# Patient Record
Sex: MALE | Race: BLACK OR AFRICAN AMERICAN | NOT HISPANIC OR LATINO | Employment: FULL TIME | URBAN - METROPOLITAN AREA
[De-identification: names, ages, dates, MRNs, and addresses within clinical notes are randomized per-mention and may not be internally consistent; named-entity substitution may affect disease eponyms.]

---

## 2021-01-15 ENCOUNTER — OFFICE VISIT (OUTPATIENT)
Dept: FAMILY MEDICINE CLINIC | Facility: CLINIC | Age: 31
End: 2021-01-15

## 2021-01-15 VITALS
DIASTOLIC BLOOD PRESSURE: 60 MMHG | TEMPERATURE: 96.8 F | HEIGHT: 77 IN | SYSTOLIC BLOOD PRESSURE: 104 MMHG | HEART RATE: 64 BPM | WEIGHT: 183 LBS | BODY MASS INDEX: 21.61 KG/M2 | OXYGEN SATURATION: 96 %

## 2021-01-15 DIAGNOSIS — Z13.220 LIPID SCREENING: ICD-10-CM

## 2021-01-15 DIAGNOSIS — Z76.89 ENCOUNTER TO ESTABLISH CARE: Primary | ICD-10-CM

## 2021-01-15 DIAGNOSIS — Z31.69 INFERTILITY COUNSELING: ICD-10-CM

## 2021-01-15 DIAGNOSIS — Z11.3 SCREEN FOR STD (SEXUALLY TRANSMITTED DISEASE): ICD-10-CM

## 2021-01-15 LAB
ALBUMIN SERPL-MCNC: 4.6 G/DL (ref 3.5–5.2)
ALBUMIN/GLOB SERPL: 1.6 G/DL
ALP SERPL-CCNC: 78 U/L (ref 39–117)
ALT SERPL W P-5'-P-CCNC: 11 U/L (ref 1–41)
ANION GAP SERPL CALCULATED.3IONS-SCNC: 9 MMOL/L (ref 5–15)
AST SERPL-CCNC: 18 U/L (ref 1–40)
BACTERIA UR QL AUTO: NORMAL /HPF
BILIRUB SERPL-MCNC: 0.9 MG/DL (ref 0–1.2)
BILIRUB UR QL STRIP: NEGATIVE
BUN SERPL-MCNC: 13 MG/DL (ref 6–20)
BUN/CREAT SERPL: 13.3 (ref 7–25)
CALCIUM SPEC-SCNC: 9.8 MG/DL (ref 8.6–10.5)
CHLORIDE SERPL-SCNC: 103 MMOL/L (ref 98–107)
CHOLEST SERPL-MCNC: 206 MG/DL (ref 0–200)
CLARITY UR: CLEAR
CO2 SERPL-SCNC: 27 MMOL/L (ref 22–29)
COLOR UR: YELLOW
CREAT SERPL-MCNC: 0.98 MG/DL (ref 0.76–1.27)
ERYTHROCYTE [DISTWIDTH] IN BLOOD BY AUTOMATED COUNT: 12.7 % (ref 12.3–15.4)
GFR SERPL CREATININE-BSD FRML MDRD: 109 ML/MIN/1.73
GLOBULIN UR ELPH-MCNC: 2.8 GM/DL
GLUCOSE SERPL-MCNC: 89 MG/DL (ref 65–99)
GLUCOSE UR STRIP-MCNC: NEGATIVE MG/DL
HCT VFR BLD AUTO: 46 % (ref 37.5–51)
HDLC SERPL-MCNC: 64 MG/DL (ref 40–60)
HGB BLD-MCNC: 14.5 G/DL (ref 13–17.7)
HGB UR QL STRIP.AUTO: ABNORMAL
HIV1+2 AB SER QL: NORMAL
KETONES UR QL STRIP: NEGATIVE
LDLC SERPL CALC-MCNC: 131 MG/DL (ref 0–100)
LDLC/HDLC SERPL: 2.03 {RATIO}
LEUKOCYTE ESTERASE UR QL STRIP.AUTO: NEGATIVE
LYMPHOCYTES # BLD AUTO: 2.1 10*3/MM3 (ref 0.7–3.1)
LYMPHOCYTES NFR BLD AUTO: 43.1 % (ref 19.6–45.3)
MCH RBC QN AUTO: 26.5 PG (ref 26.6–33)
MCHC RBC AUTO-ENTMCNC: 31.5 G/DL (ref 31.5–35.7)
MCV RBC AUTO: 84.2 FL (ref 79–97)
MONOCYTES # BLD AUTO: 0.2 10*3/MM3 (ref 0.1–0.9)
MONOCYTES NFR BLD AUTO: 4.7 % (ref 5–12)
NEUTROPHILS NFR BLD AUTO: 2.5 10*3/MM3 (ref 1.7–7)
NEUTROPHILS NFR BLD AUTO: 52.2 % (ref 42.7–76)
NITRITE UR QL STRIP: NEGATIVE
PH UR STRIP.AUTO: 5.5 [PH] (ref 4.6–8)
PLATELET # BLD AUTO: 269 10*3/MM3 (ref 140–450)
PMV BLD AUTO: 6.7 FL (ref 6–12)
POTASSIUM SERPL-SCNC: 4.5 MMOL/L (ref 3.5–5.2)
PROT SERPL-MCNC: 7.4 G/DL (ref 6–8.5)
PROT UR QL STRIP: NEGATIVE
RBC # BLD AUTO: 5.46 10*6/MM3 (ref 4.14–5.8)
RBC # UR: NORMAL /HPF
REF LAB TEST METHOD: NORMAL
SODIUM SERPL-SCNC: 139 MMOL/L (ref 136–145)
SP GR UR STRIP: >=1.03 (ref 1–1.03)
SQUAMOUS #/AREA URNS HPF: NORMAL /HPF
TRIGL SERPL-MCNC: 62 MG/DL (ref 0–150)
TSH SERPL DL<=0.05 MIU/L-ACNC: 1.11 UIU/ML (ref 0.27–4.2)
UROBILINOGEN UR QL STRIP: ABNORMAL
VLDLC SERPL-MCNC: 11 MG/DL (ref 5–40)
WBC # BLD AUTO: 4.8 10*3/MM3 (ref 3.4–10.8)
WBC UR QL AUTO: NORMAL /HPF

## 2021-01-15 PROCEDURE — G0432 EIA HIV-1/HIV-2 SCREEN: HCPCS | Performed by: NURSE PRACTITIONER

## 2021-01-15 PROCEDURE — 99203 OFFICE O/P NEW LOW 30 MIN: CPT | Performed by: NURSE PRACTITIONER

## 2021-01-15 PROCEDURE — 81001 URINALYSIS AUTO W/SCOPE: CPT | Performed by: NURSE PRACTITIONER

## 2021-01-15 PROCEDURE — 85025 COMPLETE CBC W/AUTO DIFF WBC: CPT | Performed by: NURSE PRACTITIONER

## 2021-01-15 PROCEDURE — 84443 ASSAY THYROID STIM HORMONE: CPT | Performed by: NURSE PRACTITIONER

## 2021-01-15 PROCEDURE — 86592 SYPHILIS TEST NON-TREP QUAL: CPT | Performed by: NURSE PRACTITIONER

## 2021-01-15 PROCEDURE — 80061 LIPID PANEL: CPT | Performed by: NURSE PRACTITIONER

## 2021-01-15 PROCEDURE — 80053 COMPREHEN METABOLIC PANEL: CPT | Performed by: NURSE PRACTITIONER

## 2021-01-15 NOTE — PROGRESS NOTES
"Chief Complaint  Establish Care and Fertility (wife trying to get pregnant and nothing is happening yet)    Subjective          Shiva Jones presents to [facility] for   History of Present Illness  Here today to establish care, no recent pcp, he is , works as fork , does stay active, states diet is ok, he denies smoking. He denies hx of asthma or murmur. He is fasting today.   C/o infertility, states he and his wife are trying for pregnancy but has not been successful yet. He states they have been trying for 2-3 months, states wife has had check ups for infertility. Denies problems with erection, denies any new partners he would like std screen today. denies any urinary symptoms.   Sees [facility] for rectal bleeding, has upcoming appt this month. Does have family hx of colon cancer in father. He has never has colonoscopy. He states last bleeding in 10/2020, denies diarrhea. States appetite is ok.          Objective   Vital Signs:   /60 (BP Location: Left arm, Patient Position: Sitting, Cuff Size: Adult)   Pulse 64   Temp 96.8 °F (36 °C) (Infrared)   Ht 195.6 cm (77.01\")   Wt 83 kg (183 lb)   SpO2 96%   BMI 21.70 kg/m²     Physical Exam  Vitals signs and nursing note reviewed.   Constitutional:       Appearance: He is well-developed.   HENT:      Head: Normocephalic.   Eyes:      Pupils: Pupils are equal, round, and reactive to light.   Cardiovascular:      Rate and Rhythm: Normal rate and regular rhythm.      Heart sounds: Normal heart sounds.   Pulmonary:      Effort: Pulmonary effort is normal.      Breath sounds: Normal breath sounds.   Skin:     General: Skin is warm and dry.   Neurological:      Mental Status: He is alert and oriented to person, place, and time.   Psychiatric:         Behavior: Behavior normal.         Judgment: Judgment normal.        Result Review :                 Assessment and Plan    Problem List Items Addressed This " Visit     None      Visit Diagnoses     Encounter to establish care    -  Primary    Relevant Orders    Comprehensive Metabolic Panel    CBC & Differential    Lipid Panel    TSH    Testosterone, Free, Total    CBC Auto Differential    Infertility counseling        Relevant Orders    Comprehensive Metabolic Panel    CBC & Differential    Lipid Panel    TSH    Testosterone, Free, Total    Chlamydia trachomatis, Neisseria gonorrhoeae, PCR - Urine, Urine, Clean Catch    HIV-1 / O / 2 Ag / Antibody 4th Generation    RPR    Urinalysis With Microscopic - Urine, Clean Catch    CBC Auto Differential    Urinalysis without microscopic (no culture) - Urine, Clean Catch    Urinalysis, Microscopic Only - Urine, Clean Catch    Lipid screening        Relevant Orders    Comprehensive Metabolic Panel    CBC & Differential    Lipid Panel    TSH    Testosterone, Free, Total    CBC Auto Differential    Screen for STD (sexually transmitted disease)        Relevant Orders    Chlamydia trachomatis, Neisseria gonorrhoeae, PCR - Urine, Urine, Clean Catch    HIV-1 / O / 2 Ag / Antibody 4th Generation    RPR    Urinalysis With Microscopic - Urine, Clean Catch    Urinalysis without microscopic (no culture) - Urine, Clean Catch    Urinalysis, Microscopic Only - Urine, Clean Catch          Follow Up   No follow-ups on file.  Patient was given instructions and counseling regarding his condition or for health maintenance advice. Please see specific information pulled into the AVS if appropriate.     Labs today will call with results.   Cont diet, exercise, begin MV OTC daily.   Advised he and wife can monitor ovulation for conception, cont f/u with his wife's OB if symptoms persist for possible fertility consult and work up.   Patient agrees with plan of care and understands instructions. Call if worsening symptoms or any problems or concerns.

## 2021-01-15 NOTE — PATIENT INSTRUCTIONS
Labs today will call with results.   Cont diet, exercise, begin MV OTC daily.   Advised he and wife can monitor ovulation for conception, cont f/u with his wife's OB if symptoms persist for possible fertility consult and work up.   Patient agrees with plan of care and understands instructions. Call if worsening symptoms or any problems or concerns.

## 2021-01-16 LAB — RPR SER QL: NORMAL

## 2021-01-18 DIAGNOSIS — R31.21 ASYMPTOMATIC MICROSCOPIC HEMATURIA: Primary | ICD-10-CM

## 2021-01-18 LAB
C TRACH RRNA SPEC QL NAA+PROBE: NEGATIVE
N GONORRHOEA RRNA SPEC QL NAA+PROBE: NEGATIVE
TESTOST FREE SERPL-MCNC: 20.7 PG/ML (ref 8.7–25.1)
TESTOST SERPL-MCNC: 733 NG/DL (ref 264–916)

## 2023-08-02 ENCOUNTER — APPOINTMENT (OUTPATIENT)
Dept: GENERAL RADIOLOGY | Facility: HOSPITAL | Age: 33
End: 2023-08-02
Payer: COMMERCIAL

## 2023-08-02 ENCOUNTER — HOSPITAL ENCOUNTER (EMERGENCY)
Facility: HOSPITAL | Age: 33
Discharge: HOME | End: 2023-08-02
Attending: EMERGENCY MEDICINE | Admitting: EMERGENCY MEDICINE
Payer: COMMERCIAL

## 2023-08-02 VITALS
WEIGHT: 175 LBS | RESPIRATION RATE: 15 BRPM | SYSTOLIC BLOOD PRESSURE: 135 MMHG | HEIGHT: 77 IN | BODY MASS INDEX: 20.66 KG/M2 | TEMPERATURE: 97.9 F | HEART RATE: 61 BPM | OXYGEN SATURATION: 97 % | DIASTOLIC BLOOD PRESSURE: 55 MMHG

## 2023-08-02 DIAGNOSIS — I30.9 ACUTE PERICARDITIS, UNSPECIFIED TYPE: Primary | ICD-10-CM

## 2023-08-02 LAB
ALBUMIN SERPL-MCNC: 4.8 G/DL (ref 3.5–5.2)
ALBUMIN/GLOB SERPL: 1.6 G/DL
ALP SERPL-CCNC: 77 U/L (ref 39–117)
ALT SERPL W P-5'-P-CCNC: 19 U/L (ref 1–41)
ANION GAP SERPL CALCULATED.3IONS-SCNC: 9 MMOL/L (ref 5–15)
AST SERPL-CCNC: 22 U/L (ref 1–40)
BASOPHILS # BLD AUTO: 0.03 10*3/MM3 (ref 0–0.2)
BASOPHILS NFR BLD AUTO: 0.7 % (ref 0–1.5)
BILIRUB SERPL-MCNC: 0.8 MG/DL (ref 0–1.2)
BUN SERPL-MCNC: 10 MG/DL (ref 6–20)
BUN/CREAT SERPL: 9 (ref 7–25)
CALCIUM SPEC-SCNC: 10.2 MG/DL (ref 8.6–10.5)
CHLORIDE SERPL-SCNC: 100 MMOL/L (ref 98–107)
CO2 SERPL-SCNC: 27 MMOL/L (ref 22–29)
CREAT SERPL-MCNC: 1.11 MG/DL (ref 0.76–1.27)
CRP SERPL-MCNC: <0.3 MG/DL (ref 0–0.5)
DEPRECATED RDW RBC AUTO: 40.4 FL (ref 37–54)
EGFRCR SERPLBLD CKD-EPI 2021: 90.5 ML/MIN/1.73
EOSINOPHIL # BLD AUTO: 0.04 10*3/MM3 (ref 0–0.4)
EOSINOPHIL NFR BLD AUTO: 0.9 % (ref 0.3–6.2)
ERYTHROCYTE [DISTWIDTH] IN BLOOD BY AUTOMATED COUNT: 13.2 % (ref 12.3–15.4)
ERYTHROCYTE [SEDIMENTATION RATE] IN BLOOD: 7 MM/HR (ref 0–15)
GLOBULIN UR ELPH-MCNC: 3 GM/DL
GLUCOSE SERPL-MCNC: 88 MG/DL (ref 65–99)
HCT VFR BLD AUTO: 46 % (ref 37.5–51)
HGB BLD-MCNC: 15.1 G/DL (ref 13–17.7)
HOLD SPECIMEN: NORMAL
HOLD SPECIMEN: NORMAL
IMM GRANULOCYTES # BLD AUTO: 0.01 10*3/MM3 (ref 0–0.05)
IMM GRANULOCYTES NFR BLD AUTO: 0.2 % (ref 0–0.5)
LYMPHOCYTES # BLD AUTO: 1.89 10*3/MM3 (ref 0.7–3.1)
LYMPHOCYTES NFR BLD AUTO: 43.6 % (ref 19.6–45.3)
MCH RBC QN AUTO: 27.6 PG (ref 26.6–33)
MCHC RBC AUTO-ENTMCNC: 32.8 G/DL (ref 31.5–35.7)
MCV RBC AUTO: 83.9 FL (ref 79–97)
MONOCYTES # BLD AUTO: 0.23 10*3/MM3 (ref 0.1–0.9)
MONOCYTES NFR BLD AUTO: 5.3 % (ref 5–12)
NEUTROPHILS NFR BLD AUTO: 2.13 10*3/MM3 (ref 1.7–7)
NEUTROPHILS NFR BLD AUTO: 49.3 % (ref 42.7–76)
NRBC BLD AUTO-RTO: 0 /100 WBC (ref 0–0.2)
PLATELET # BLD AUTO: 234 10*3/MM3 (ref 140–450)
PMV BLD AUTO: 8.5 FL (ref 6–12)
POTASSIUM SERPL-SCNC: 4.1 MMOL/L (ref 3.5–5.2)
PROT SERPL-MCNC: 7.8 G/DL (ref 6–8.5)
QT INTERVAL: 395 MS
RBC # BLD AUTO: 5.48 10*6/MM3 (ref 4.14–5.8)
SODIUM SERPL-SCNC: 136 MMOL/L (ref 136–145)
TROPONIN T SERPL HS-MCNC: 8 NG/L
WBC NRBC COR # BLD: 4.33 10*3/MM3 (ref 3.4–10.8)
WHOLE BLOOD HOLD COAG: NORMAL
WHOLE BLOOD HOLD SPECIMEN: NORMAL

## 2023-08-02 PROCEDURE — 99284 EMERGENCY DEPT VISIT MOD MDM: CPT

## 2023-08-02 PROCEDURE — 86140 C-REACTIVE PROTEIN: CPT | Performed by: NURSE PRACTITIONER

## 2023-08-02 PROCEDURE — 93010 ELECTROCARDIOGRAM REPORT: CPT | Performed by: INTERNAL MEDICINE

## 2023-08-02 PROCEDURE — 93005 ELECTROCARDIOGRAM TRACING: CPT

## 2023-08-02 PROCEDURE — 80053 COMPREHEN METABOLIC PANEL: CPT | Performed by: NURSE PRACTITIONER

## 2023-08-02 PROCEDURE — 84484 ASSAY OF TROPONIN QUANT: CPT | Performed by: NURSE PRACTITIONER

## 2023-08-02 PROCEDURE — 85025 COMPLETE CBC W/AUTO DIFF WBC: CPT | Performed by: NURSE PRACTITIONER

## 2023-08-02 PROCEDURE — 85652 RBC SED RATE AUTOMATED: CPT | Performed by: NURSE PRACTITIONER

## 2023-08-02 PROCEDURE — 71045 X-RAY EXAM CHEST 1 VIEW: CPT

## 2023-08-02 RX ORDER — SODIUM CHLORIDE 0.9 % (FLUSH) 0.9 %
10 SYRINGE (ML) INJECTION AS NEEDED
Status: DISCONTINUED | OUTPATIENT
Start: 2023-08-02 | End: 2023-08-02 | Stop reason: HOSPADM

## 2023-08-02 RX ORDER — ASPIRIN 81 MG/1
324 TABLET, CHEWABLE ORAL ONCE
Status: COMPLETED | OUTPATIENT
Start: 2023-08-02 | End: 2023-08-02

## 2023-08-02 RX ORDER — IBUPROFEN 800 MG/1
800 TABLET ORAL
Qty: 21 TABLET | Refills: 0 | Status: SHIPPED | OUTPATIENT
Start: 2023-08-02 | End: 2023-08-10

## 2023-08-02 RX ADMIN — ASPIRIN 324 MG: 81 TABLET, CHEWABLE ORAL at 13:52

## 2023-08-02 NOTE — ED NOTES
Pt to ED c/o cp and pressue that goes down his L arm that started yesterday. States he gets cp frequently but this is the first time it's lasted this long.

## 2023-08-02 NOTE — ED PROVIDER NOTES
EMERGENCY DEPARTMENT ENCOUNTER    Room Number:  07/07  PCP: Lissa Silveira APRN  Historian: patient      HPI:  Chief Complaint: chest pain  A complete HPI/ROS/PMH/PSH/SH/FH are unobtainable due to: nothing  Context: Shiva Jones is a pleasant afebrile ambulatory 32 y.o. black male who presents to the ED c/o chest pain    Patient states it is worse when he lays on his left side.  He denies any pleuritic component to pain.  He denies any recent upper respiratory viral illnesses over the last 6 weeks.    States he has had this pain previously but since pain started yesterday after seeing a movie its been fairly constant.  States it is worse when he lays on his left side.  Denies any improving factors.    He denies any cough, fever or chills.        PAST MEDICAL HISTORY  Active Ambulatory Problems     Diagnosis Date Noted    No Active Ambulatory Problems     Resolved Ambulatory Problems     Diagnosis Date Noted    No Resolved Ambulatory Problems     No Additional Past Medical History         PAST SURGICAL HISTORY  History reviewed. No pertinent surgical history.      FAMILY HISTORY  Family History   Problem Relation Age of Onset    Cancer Mother         breast    Cancer Father         colon         SOCIAL HISTORY  Social History     Socioeconomic History    Marital status: Single   Tobacco Use    Smoking status: Never   Substance and Sexual Activity    Alcohol use: Yes    Drug use: Defer         ALLERGIES  Patient has no known allergies.        REVIEW OF SYSTEMS  Review of Systems   Cardiovascular:  Positive for chest pain.        PHYSICAL EXAM  ED Triage Vitals   Temp Heart Rate Resp BP SpO2   08/02/23 1314 08/02/23 1314 08/02/23 1314 08/02/23 1323 08/02/23 1314   97.9 øF (36.6 øC) 71 15 126/75 98 %      Temp src Heart Rate Source Patient Position BP Location FiO2 (%)   -- -- -- -- --              Physical Exam      GENERAL: Alert and oriented x4, no acute distress  HENT: nares patent, mucous membranes are moist  and intact  EYES: no scleral icterus, no injection  CV: regular rhythm, normal rate, no rubs or murmurs appreciated, no peripheral edema  RESPIRATORY: normal effort clear to all fields bilaterally  ABDOMEN: soft nontender diffusely, no rebound or guarding  MUSCULOSKELETAL: no deformity, normal active range of motion to all extremities  NEURO: alert, moves all extremities, follows commands  PSYCH:  calm, cooperative  SKIN: warm, dry and intact    Vital signs and nursing notes reviewed.          LAB RESULTS  Recent Results (from the past 24 hour(s))   ECG 12 Lead Chest Pain    Collection Time: 08/02/23  1:19 PM   Result Value Ref Range    QT Interval 395 ms   Comprehensive Metabolic Panel    Collection Time: 08/02/23  1:59 PM    Specimen: Blood   Result Value Ref Range    Glucose 88 65 - 99 mg/dL    BUN 10 6 - 20 mg/dL    Creatinine 1.11 0.76 - 1.27 mg/dL    Sodium 136 136 - 145 mmol/L    Potassium 4.1 3.5 - 5.2 mmol/L    Chloride 100 98 - 107 mmol/L    CO2 27.0 22.0 - 29.0 mmol/L    Calcium 10.2 8.6 - 10.5 mg/dL    Total Protein 7.8 6.0 - 8.5 g/dL    Albumin 4.8 3.5 - 5.2 g/dL    ALT (SGPT) 19 1 - 41 U/L    AST (SGOT) 22 1 - 40 U/L    Alkaline Phosphatase 77 39 - 117 U/L    Total Bilirubin 0.8 0.0 - 1.2 mg/dL    Globulin 3.0 gm/dL    A/G Ratio 1.6 g/dL    BUN/Creatinine Ratio 9.0 7.0 - 25.0    Anion Gap 9.0 5.0 - 15.0 mmol/L    eGFR 90.5 >60.0 mL/min/1.73   High Sensitivity Troponin T    Collection Time: 08/02/23  1:59 PM    Specimen: Blood   Result Value Ref Range    HS Troponin T 8 <15 ng/L   Green Top (Gel)    Collection Time: 08/02/23  1:59 PM   Result Value Ref Range    Extra Tube Hold for add-ons.    Lavender Top    Collection Time: 08/02/23  1:59 PM   Result Value Ref Range    Extra Tube hold for add-on    Gold Top - SST    Collection Time: 08/02/23  1:59 PM   Result Value Ref Range    Extra Tube Hold for add-ons.    Light Blue Top    Collection Time: 08/02/23  1:59 PM   Result Value Ref Range    Extra Tube  Hold for add-ons.    CBC Auto Differential    Collection Time: 08/02/23  1:59 PM    Specimen: Blood   Result Value Ref Range    WBC 4.33 3.40 - 10.80 10*3/mm3    RBC 5.48 4.14 - 5.80 10*6/mm3    Hemoglobin 15.1 13.0 - 17.7 g/dL    Hematocrit 46.0 37.5 - 51.0 %    MCV 83.9 79.0 - 97.0 fL    MCH 27.6 26.6 - 33.0 pg    MCHC 32.8 31.5 - 35.7 g/dL    RDW 13.2 12.3 - 15.4 %    RDW-SD 40.4 37.0 - 54.0 fl    MPV 8.5 6.0 - 12.0 fL    Platelets 234 140 - 450 10*3/mm3    Neutrophil % 49.3 42.7 - 76.0 %    Lymphocyte % 43.6 19.6 - 45.3 %    Monocyte % 5.3 5.0 - 12.0 %    Eosinophil % 0.9 0.3 - 6.2 %    Basophil % 0.7 0.0 - 1.5 %    Immature Grans % 0.2 0.0 - 0.5 %    Neutrophils, Absolute 2.13 1.70 - 7.00 10*3/mm3    Lymphocytes, Absolute 1.89 0.70 - 3.10 10*3/mm3    Monocytes, Absolute 0.23 0.10 - 0.90 10*3/mm3    Eosinophils, Absolute 0.04 0.00 - 0.40 10*3/mm3    Basophils, Absolute 0.03 0.00 - 0.20 10*3/mm3    Immature Grans, Absolute 0.01 0.00 - 0.05 10*3/mm3    nRBC 0.0 0.0 - 0.2 /100 WBC   C-reactive Protein    Collection Time: 08/02/23  1:59 PM    Specimen: Blood   Result Value Ref Range    C-Reactive Protein <0.30 0.00 - 0.50 mg/dL   Sedimentation Rate    Collection Time: 08/02/23  1:59 PM    Specimen: Blood   Result Value Ref Range    Sed Rate 7 0 - 15 mm/hr       Ordered the above labs and reviewed the results.        RADIOLOGY  XR Chest 1 View    Result Date: 8/2/2023  XR CHEST 1 VW-  HISTORY: Male who is 32 years-old,  chest pain  TECHNIQUE: Frontal view of the chest  COMPARISON: None available  FINDINGS: Heart, mediastinum and pulmonary vasculature are unremarkable. No focal pulmonary consolidation, pleural effusion, or pneumothorax. No acute osseous process.      No evidence for acute pulmonary process. Follow-up as clinical indications persist.  This report was finalized on 8/2/2023 2:33 PM by Dr. Raoul Diaz M.D.       Ordered the above noted radiological studies. Reviewed by me in PACS.             PROCEDURES  Procedures      MEDICATIONS GIVEN IN ER  Medications   sodium chloride 0.9 % flush 10 mL (has no administration in time range)   aspirin chewable tablet 324 mg (324 mg Oral Given 8/2/23 1352)                   MEDICAL DECISION MAKING, PROGRESS, and CONSULTS    All labs have been independently reviewed by me.  All radiology studies have been reviewed by me and I have also reviewed the radiology report.   EKG's independently viewed and interpreted by me.  Discussion below represents my analysis of pertinent findings related to patient's condition, differential diagnosis, treatment plan and final disposition.      Additional sources:  - Discussed/ obtained information from independent historians: Obtained from patient    - External (non-ED) record review: Office visit 3/27/2023 patient seen at [facility] urgent care by Za Azevedo PA-C for malodorous urine    - Chronic or social conditions impacting care: Reports good social support    - Shared decision making: Discussed test results and treatment plan with patient and family, patient is agreeable to discharge home      Orders placed during this visit:  Orders Placed This Encounter   Procedures    XR Chest 1 View    [place]    Comprehensive Metabolic Panel    High Sensitivity Troponin T    CBC Auto Differential    High Sensitivity Troponin T 2Hr    Continuous Pulse Oximetry    Vital Signs    Cardiology (on-call MD unless specified)    Oxygen Therapy- Nasal Cannula; Titrate 1-6 LPM Per SpO2; 90 - 95%    ECG 12 Lead Chest Pain    ECG 12 Lead Chest Pain    Insert Peripheral IV    CBC & Differential    Green Top (Gel)    Lavender Top    Gold Top - SST    Light Blue Top         Additional orders considered but not ordered:  I considered a D-dimer on this patient but given lack of pleuritic component, non-smoker, no leg swelling, reassuring vital signs do not think a CTA is warranted at this time        Differential diagnosis includes but is not  limited to:    ACS, pericarditis, costochondritis      Independent interpretation of labs, radiology studies, and discussions with consultants:  ED Course as of 08/02/23 1635   Wed Aug 02, 2023   1403 EKG per my independent interpretation 13:19 8/2/23 shows diffuse ST elevations consistent with acute pericarditis, sinus rhythm rate of 56, QTc 382 [AH]   1510 HS Troponin T: 8 [AH]   1510 Chest x-ray per my independent interpretation is no pneumothorax []   1548 Phone call with dr. prieto.  Discussed the patient, relevant history, exam, diagnostics, ED findings/progress, and concerns. Md recommends ESR & CRP and outpt pericarditis workup if pain persists but pt can likely go home on NSAIDS []   1609 Sed Rate: 7 []   1634 C-Reactive Protein: <0.30 []      ED Course User Index  [] Dory Bradley APRN             I have worn appropriate PPE during this patient encounter, sanitized my hands both with entering and exiting patient's room.      DIAGNOSIS  Final diagnoses:   Acute pericarditis, unspecified type         DISPOSITION  home            Latest Documented Vital Signs:  As of 15:24 EDT  BP- 135/55 HR- 61 Temp- 97.9 øF (36.6 øC) O2 sat- 97%              --    Please note that portions of this were completed with a voice recognition program.       Note Disclaimer: At [facility], we believe that sharing information builds trust and better relationships. You are receiving this note because you are receiving care at [facility] or recently visited. It is possible you will see health information before a provider has talked with you about it. This kind of information can be easy to misunderstand. To help you fully understand what it means for your health, we urge you to discuss this note with your provider.             Dory Bradley APRN  08/02/23 [id number]

## 2023-08-10 ENCOUNTER — OFFICE VISIT (OUTPATIENT)
Dept: CARDIOLOGY | Facility: CLINIC | Age: 33
End: 2023-08-10
Payer: COMMERCIAL

## 2023-08-10 VITALS
OXYGEN SATURATION: 100 % | BODY MASS INDEX: 21.49 KG/M2 | HEIGHT: 77 IN | DIASTOLIC BLOOD PRESSURE: 80 MMHG | WEIGHT: 182 LBS | HEART RATE: 51 BPM | SYSTOLIC BLOOD PRESSURE: 130 MMHG

## 2023-08-10 DIAGNOSIS — R07.89 CHEST PAIN, MUSCULOSKELETAL: Primary | ICD-10-CM

## 2023-08-10 NOTE — PROGRESS NOTES
"      CARDIOLOGY    Zaire Lloyd MD    ENCOUNTER DATE:  08/10/2023    Shiva Jones / 32 y.o. / male        CHIEF COMPLAINT / REASON FOR OFFICE VISIT     Chest Pain      HISTORY OF PRESENT ILLNESS       Chest Pain     Shiva Jones is a 32 y.o. male who presents today for consultation.  Patient was in the emergency room on 8/2/2023.  He said he walked out of the movies when he subsequently had a severe discomfort in his chest.  Patient said the discomfort actually started years ago when he would lay on his left side.  Ultimately he stopped laying on his left side and the discomfort went away.  Again it reoccurred first part of this month said it was extremely intense he was not sure he was having a heart attack so he went for evaluation.  Patient follows up today doing better.  The discomfort is still there but improving with ibuprofen.  Patient has been active he still plays basketball at least every other week at a relatively high level.      The following portions of the patient's history were reviewed and updated as appropriate: allergies, current medications, past family history, past medical history, past social history, past surgical history and problem list.      VITAL SIGNS     Visit Vitals  /80 (BP Location: Left arm)   Pulse 51   Ht 195.6 cm (77\")   Wt 82.6 kg (182 lb)   SpO2 100%   BMI 21.58 kg/mý         Wt Readings from Last 3 Encounters:   08/10/23 82.6 kg (182 lb)   08/02/23 79.4 kg (175 lb)   01/15/21 83 kg (183 lb)     Body mass index is 21.58 kg/mý.      REVIEW OF SYSTEMS   Review of Systems   Cardiovascular:  Positive for chest pain.         PHYSICAL EXAMINATION     Vitals reviewed.   Constitutional:       Appearance: Healthy appearance.   Pulmonary:      Effort: Pulmonary effort is normal.   Cardiovascular:      Normal rate. Regular rhythm. Normal S1. Normal S2.       Murmurs: There is no murmur.      No gallop.  No click. No rub.   Pulses:     Intact distal pulses.   Edema:     " Peripheral edema absent.   Neurological:      Mental Status: Alert.         REVIEWED DATA     Procedures    Cardiac Procedures:            ASSESSMENT & PLAN      Diagnosis Plan   1. Chest pain, musculoskeletal              SUMMARY/DISCUSSION  Chest discomfort very consistent with costochondritis.  At this point I would continue the ibuprofen especially since his symptoms continue to improve.  I told him does not go away in the next week or 2 to contact me back I consider further workup.  You can palpate his fourth costal chondral junction on the left side and reproduces discomfort.        MEDICATIONS         Discharge Medications            Accurate as of August 10, 2023  1:48 PM. If you have any questions, ask your nurse or doctor.                Continue These Medications        Instructions Start Date   ibuprofen 800 MG tablet  Commonly known as: ADVIL,MOTRIN   800 mg, Oral, 3 Times Daily With Meals                   **Dragon Disclaimer:   Much of this encounter note is an electronic transcription/translation of spoken language to printed text. The electronic translation of spoken language may permit erroneous, or at times, nonsensical words or phrases to be inadvertently transcribed. Although I have reviewed the note for such errors, some may still exist.

## 2023-09-12 ENCOUNTER — HOSPITAL ENCOUNTER (EMERGENCY)
Facility: HOSPITAL | Age: 33
Discharge: HOME | End: 2023-09-12
Attending: EMERGENCY MEDICINE
Payer: COMMERCIAL

## 2023-09-12 ENCOUNTER — APPOINTMENT (OUTPATIENT)
Dept: CT IMAGING | Facility: HOSPITAL | Age: 33
End: 2023-09-12
Payer: COMMERCIAL

## 2023-09-12 VITALS
HEART RATE: 79 BPM | WEIGHT: 182 LBS | RESPIRATION RATE: 16 BRPM | SYSTOLIC BLOOD PRESSURE: 136 MMHG | BODY MASS INDEX: 21.49 KG/M2 | OXYGEN SATURATION: 100 % | HEIGHT: 77 IN | TEMPERATURE: 97.7 F | DIASTOLIC BLOOD PRESSURE: 81 MMHG

## 2023-09-12 DIAGNOSIS — R07.89 CHEST WALL PAIN: Primary | ICD-10-CM

## 2023-09-12 LAB
ALBUMIN SERPL-MCNC: 4.6 G/DL (ref 3.5–5.2)
ALBUMIN/GLOB SERPL: 1.4 G/DL
ALP SERPL-CCNC: 75 U/L (ref 39–117)
ALT SERPL W P-5'-P-CCNC: 15 U/L (ref 1–41)
ANION GAP SERPL CALCULATED.3IONS-SCNC: 10.8 MMOL/L (ref 5–15)
AST SERPL-CCNC: 21 U/L (ref 1–40)
BASOPHILS # BLD AUTO: 0.03 10*3/MM3 (ref 0–0.2)
BASOPHILS NFR BLD AUTO: 0.5 % (ref 0–1.5)
BILIRUB SERPL-MCNC: 0.8 MG/DL (ref 0–1.2)
BUN SERPL-MCNC: 10 MG/DL (ref 6–20)
BUN/CREAT SERPL: 9.6 (ref 7–25)
CALCIUM SPEC-SCNC: 9.8 MG/DL (ref 8.6–10.5)
CHLORIDE SERPL-SCNC: 103 MMOL/L (ref 98–107)
CO2 SERPL-SCNC: 28.2 MMOL/L (ref 22–29)
CREAT SERPL-MCNC: 1.04 MG/DL (ref 0.76–1.27)
DEPRECATED RDW RBC AUTO: 38.2 FL (ref 37–54)
EGFRCR SERPLBLD CKD-EPI 2021: 97.8 ML/MIN/1.73
EOSINOPHIL # BLD AUTO: 0.08 10*3/MM3 (ref 0–0.4)
EOSINOPHIL NFR BLD AUTO: 1.3 % (ref 0.3–6.2)
ERYTHROCYTE [DISTWIDTH] IN BLOOD BY AUTOMATED COUNT: 12.8 % (ref 12.3–15.4)
GLOBULIN UR ELPH-MCNC: 3.2 GM/DL
GLUCOSE SERPL-MCNC: 88 MG/DL (ref 65–99)
HCT VFR BLD AUTO: 44.9 % (ref 37.5–51)
HGB BLD-MCNC: 14.6 G/DL (ref 13–17.7)
IMM GRANULOCYTES # BLD AUTO: 0.01 10*3/MM3 (ref 0–0.05)
IMM GRANULOCYTES NFR BLD AUTO: 0.2 % (ref 0–0.5)
LYMPHOCYTES # BLD AUTO: 2.11 10*3/MM3 (ref 0.7–3.1)
LYMPHOCYTES NFR BLD AUTO: 35.2 % (ref 19.6–45.3)
MCH RBC QN AUTO: 27 PG (ref 26.6–33)
MCHC RBC AUTO-ENTMCNC: 32.5 G/DL (ref 31.5–35.7)
MCV RBC AUTO: 83 FL (ref 79–97)
MONOCYTES # BLD AUTO: 0.37 10*3/MM3 (ref 0.1–0.9)
MONOCYTES NFR BLD AUTO: 6.2 % (ref 5–12)
NEUTROPHILS NFR BLD AUTO: 3.4 10*3/MM3 (ref 1.7–7)
NEUTROPHILS NFR BLD AUTO: 56.6 % (ref 42.7–76)
NRBC BLD AUTO-RTO: 0 /100 WBC (ref 0–0.2)
PLATELET # BLD AUTO: 240 10*3/MM3 (ref 140–450)
PMV BLD AUTO: 8.8 FL (ref 6–12)
POTASSIUM SERPL-SCNC: 4.2 MMOL/L (ref 3.5–5.2)
PROT SERPL-MCNC: 7.8 G/DL (ref 6–8.5)
RBC # BLD AUTO: 5.41 10*6/MM3 (ref 4.14–5.8)
SODIUM SERPL-SCNC: 142 MMOL/L (ref 136–145)
TROPONIN T SERPL HS-MCNC: 8 NG/L
WBC NRBC COR # BLD: 6 10*3/MM3 (ref 3.4–10.8)

## 2023-09-12 PROCEDURE — 80053 COMPREHEN METABOLIC PANEL: CPT | Performed by: PHYSICIAN ASSISTANT

## 2023-09-12 PROCEDURE — 85025 COMPLETE CBC W/AUTO DIFF WBC: CPT | Performed by: PHYSICIAN ASSISTANT

## 2023-09-12 PROCEDURE — 84484 ASSAY OF TROPONIN QUANT: CPT | Performed by: PHYSICIAN ASSISTANT

## 2023-09-12 PROCEDURE — 71275 CT ANGIOGRAPHY CHEST: CPT

## 2023-09-12 PROCEDURE — 25510000001 IOPAMIDOL PER 1 ML: Performed by: EMERGENCY MEDICINE

## 2023-09-12 PROCEDURE — 93005 ELECTROCARDIOGRAM TRACING: CPT

## 2023-09-12 PROCEDURE — 96374 THER/PROPH/DIAG INJ IV PUSH: CPT

## 2023-09-12 PROCEDURE — 93010 ELECTROCARDIOGRAM REPORT: CPT | Performed by: INTERNAL MEDICINE

## 2023-09-12 PROCEDURE — 25010000002 KETOROLAC TROMETHAMINE PER 15 MG: Performed by: PHYSICIAN ASSISTANT

## 2023-09-12 PROCEDURE — 99285 EMERGENCY DEPT VISIT HI MDM: CPT

## 2023-09-12 RX ORDER — KETOROLAC TROMETHAMINE 30 MG/ML
30 INJECTION, SOLUTION INTRAMUSCULAR; INTRAVENOUS ONCE
Status: COMPLETED | OUTPATIENT
Start: 2023-09-12 | End: 2023-09-12

## 2023-09-12 RX ORDER — IBUPROFEN 800 MG/1
800 TABLET ORAL EVERY 8 HOURS PRN
Qty: 30 TABLET | Refills: 0 | Status: SHIPPED | OUTPATIENT
Start: 2023-09-12 | End: 2023-09-15 | Stop reason: SDUPTHER

## 2023-09-12 RX ADMIN — KETOROLAC TROMETHAMINE 30 MG: 30 INJECTION, SOLUTION INTRAMUSCULAR at 18:55

## 2023-09-12 RX ADMIN — IOPAMIDOL 95 ML: 755 INJECTION, SOLUTION INTRAVENOUS at 18:02

## 2023-09-12 NOTE — ED PROVIDER NOTES
EMERGENCY DEPARTMENT ENCOUNTER    Room Number:  05/05  PCP: Lissa Silveira APRN  Discussed/ obtained information from independent historians: patient, mother      HPI:  Chief Complaint: chest pain  A complete HPI/ROS/PMH/PSH/SH/FH are unobtainable due to: none  Context: Shiva Jones is a 32 y.o. male who presents to the ED c/o chest pain to the left side of his sternum as well as pain in his left deltoid that radiates down to his forearm.  He states the pain has been there for approximately 4 days, the forearm pain started today.  His symptoms had been intermittent but starting at 1 PM they became constant and have been present since.  They are not pleuritic or exertional and nothing seems to make it worse or better.  He states he was previously seen in the ER for chest pain a couple months ago and was discharged, followed up with cardiologist who felt he had costochondritis.  He states when his symptoms started a few days ago he started taking ibuprofen again but without relief.  Patient denies any history of hypertension hyperlipidemia or diabetes.  He does not smoke, denies any cocaine use.  No family history of CAD before age 55 that he is aware of.    Mother was on the phone during the history taking.  She states that the patient has 2 cousins that have been diagnosed with blood clots in the lungs, one of them passed away, the other 1 was treated successfully.  She does not know if they have clotting disorder but is concerned about this is a source of her son's chest pain.      External (non-ED) record review: Office visit with cardiology on 8/10/2023 for chest pain after an emergency room visit on 8/2/2023 for chest pain.  Discomfort was still present but improving with ibuprofen.  It was felt his symptoms were consistent with costochondritis, and ibuprofen was continued and he was to recheck in 1 to 2 weeks if symptoms not resolved.      PAST MEDICAL HISTORY  Active Ambulatory Problems     Diagnosis  Date Noted    No Active Ambulatory Problems     Resolved Ambulatory Problems     Diagnosis Date Noted    No Resolved Ambulatory Problems     Past Medical History:   Diagnosis Date    Cervical lymphadenopathy     Chest pain     Epididymitis     Migraine     Pericarditis     Tendinitis of wrist          PAST SURGICAL HISTORY  Past Surgical History:   Procedure Laterality Date    WISDOM TOOTH EXTRACTION           FAMILY HISTORY  Family History   Problem Relation Age of Onset    Cancer Mother         breast    Cancer Father         colon         SOCIAL HISTORY  Social History     Socioeconomic History    Marital status:    Tobacco Use    Smoking status: Never    Smokeless tobacco: Never   Substance and Sexual Activity    Alcohol use: Yes    Drug use: Defer         ALLERGIES  Patient has no known allergies.        REVIEW OF SYSTEMS  Review of Systems         PHYSICAL EXAM  ED Triage Vitals [09/12/23 1636]   Temp Heart Rate Resp BP SpO2   97.7 °F (36.5 °C) 100 -- -- 100 %      Temp src Heart Rate Source Patient Position BP Location FiO2 (%)   -- -- -- -- --       Physical Exam      GENERAL: no acute distress  HENT: normocephalic, atraumatic  EYES: no scleral icterus  CV: regular rhythm, normal rate  RESPIRATORY: normal effort CTA B.  Chest pain is reproduced by palpation of the costochondral junction on the left superior to mid chest just left of the sternum.  ABDOMEN: nondistended  MUSCULOSKELETAL: no deformity, Sensation is intact to light touch in radial, ulnar, and median nerve distributions. There is strong finger abduction, thumb and pinky adduction, and wrist extension. Radial and ulnar pulses are 2+ and equal bilaterally  NEURO: alert, moves all extremities, follows commands  PSYCH:  calm, cooperative  SKIN: warm, dry    Vital signs and nursing notes reviewed.          LAB RESULTS  Recent Results (from the past 24 hour(s))   ECG 12 Lead Chest Pain    Collection Time: 09/12/23  4:40 PM   Result Value Ref  Range    QT Interval 363 ms    QTC Interval 369 ms   Comprehensive Metabolic Panel    Collection Time: 09/12/23  5:15 PM    Specimen: Arm, Right; Blood   Result Value Ref Range    Glucose 88 65 - 99 mg/dL    BUN 10 6 - 20 mg/dL    Creatinine 1.04 0.76 - 1.27 mg/dL    Sodium 142 136 - 145 mmol/L    Potassium 4.2 3.5 - 5.2 mmol/L    Chloride 103 98 - 107 mmol/L    CO2 28.2 22.0 - 29.0 mmol/L    Calcium 9.8 8.6 - 10.5 mg/dL    Total Protein 7.8 6.0 - 8.5 g/dL    Albumin 4.6 3.5 - 5.2 g/dL    ALT (SGPT) 15 1 - 41 U/L    AST (SGOT) 21 1 - 40 U/L    Alkaline Phosphatase 75 39 - 117 U/L    Total Bilirubin 0.8 0.0 - 1.2 mg/dL    Globulin 3.2 gm/dL    A/G Ratio 1.4 g/dL    BUN/Creatinine Ratio 9.6 7.0 - 25.0    Anion Gap 10.8 5.0 - 15.0 mmol/L    eGFR 97.8 >60.0 mL/min/1.73   High Sensitivity Troponin T    Collection Time: 09/12/23  5:15 PM    Specimen: Arm, Right; Blood   Result Value Ref Range    HS Troponin T 8 <15 ng/L   CBC Auto Differential    Collection Time: 09/12/23  5:15 PM    Specimen: Arm, Right; Blood   Result Value Ref Range    WBC 6.00 3.40 - 10.80 10*3/mm3    RBC 5.41 4.14 - 5.80 10*6/mm3    Hemoglobin 14.6 13.0 - 17.7 g/dL    Hematocrit 44.9 37.5 - 51.0 %    MCV 83.0 79.0 - 97.0 fL    MCH 27.0 26.6 - 33.0 pg    MCHC 32.5 31.5 - 35.7 g/dL    RDW 12.8 12.3 - 15.4 %    RDW-SD 38.2 37.0 - 54.0 fl    MPV 8.8 6.0 - 12.0 fL    Platelets 240 140 - 450 10*3/mm3    Neutrophil % 56.6 42.7 - 76.0 %    Lymphocyte % 35.2 19.6 - 45.3 %    Monocyte % 6.2 5.0 - 12.0 %    Eosinophil % 1.3 0.3 - 6.2 %    Basophil % 0.5 0.0 - 1.5 %    Immature Grans % 0.2 0.0 - 0.5 %    Neutrophils, Absolute 3.40 1.70 - 7.00 10*3/mm3    Lymphocytes, Absolute 2.11 0.70 - 3.10 10*3/mm3    Monocytes, Absolute 0.37 0.10 - 0.90 10*3/mm3    Eosinophils, Absolute 0.08 0.00 - 0.40 10*3/mm3    Basophils, Absolute 0.03 0.00 - 0.20 10*3/mm3    Immature Grans, Absolute 0.01 0.00 - 0.05 10*3/mm3    nRBC 0.0 0.0 - 0.2 /100 WBC       Ordered the above labs  and reviewed the results.        RADIOLOGY  CT Angiogram Chest Pulmonary Embolism    Result Date: 9/12/2023  CT angiogram of the chest with contrast including reconstruction images 9/12/2023  HISTORY: Chest pain. Possible pulmonary embolus.  Following the intravenous contrast injection CT angiography was performed through the chest. Sagittal, coronal and 3D reconstruction images were reviewed.  The pulmonary arterial system is well opacified with no evidence of pulmonary embolus. No pathologically enlarged hilar or mediastinal lymph nodes are seen. No lung masses or significant pulmonary infiltrates are seen.      1. No evidence of pulmonary embolus.  Radiation dose reduction techniques were utilized, including automated exposure control and exposure modulation based on body size.        Ordered the above noted radiological studies. Reviewed by me in PACS.            PROCEDURES  Procedures              MEDICATIONS GIVEN IN ER  Medications   ketorolac (TORADOL) injection 30 mg (has no administration in time range)   iopamidol (ISOVUE-370) 76 % injection 95 mL (95 mL Intravenous Given by Other 9/12/23 1802)                   MEDICAL DECISION MAKING, PROGRESS, and CONSULTS    All labs have been independently reviewed by me.  All radiology studies have been reviewed by me and I have also reviewed the radiology report.   EKG's independently viewed and interpreted by me.  Discussion below represents my analysis of pertinent findings related to patient's condition, differential diagnosis, treatment plan and final disposition.            Orders placed during this visit:  Orders Placed This Encounter   Procedures    CT Angiogram Chest Pulmonary Embolism    Comprehensive Metabolic Panel    High Sensitivity Troponin T    CBC Auto Differential    High Sensitivity Troponin T 2Hr    Monitor Blood Pressure    Pulse Oximetry, Continuous    ECG 12 Lead Chest Pain    CBC & Differential           Differential diagnosis:  DDx includes  but is not limited to acute coronary syndrome, pulmonary embolism, thoracic aortic dissection, pneumonia, pneumothorax, musculoskeletal pain, GERD or esophageal spasm, anxiety, myocarditis/pericarditis, esophageal rupture, pancreatitis.     History: 0  EC  Age: 0  Risk factors: 0    HEAR score: 1        Independent interpretation of labs, radiology studies, and discussions with consultants:  ED Course as of 09/12/23 1855   Tue Sep 12, 2023   1659 My independent interpretation of the EKG performed at 1640 is sinus rhythm, normal axis, normal intervals, RSR in V1 and V2, there is some ST elevation in the anterior leads without reciprocal change suggestive of early repolarization.  There is no significant change from prior on 2023. [KA]   174 WBC: 6.00 [KA]   1741 Hemoglobin: 14.6 [KA]   1822 HS Troponin T: 8 [KA]   1822 Glucose: 88 [KA]   1822 Sodium: 142 [KA]   1822 WBC: 6.00 [KA]   1822 Hemoglobin: 14.6 [KA]   1851 I reassessed the patient.  I counseled him on all of his lab and imaging results.  Ultimately his chest pain is reproducible with palpation of the chest wall, is in the same location and character and severity is previous chest pain which was felt to be costochondritis by cardiology after an unremarkable ER work-up.  CTA chest shows no PE or other acute abnormality.  Ultimately he is only been taking 1 or 2 ibuprofen a day for the last few days I have recommended that he increase it to 800 3 times daily and we will write him a new prescription.  I have encouraged him to follow-up with his PCP as well as his cardiologist and he should call them tomorrow to schedule.  He can return to the ER anytime for any new or worsening symptoms or any concerns.  He verbalized understanding this and is agreeable with the plan for discharge.  We will give a dose of Toradol prior to discharge. [KA]      ED Course User Index  [KA] Janice Anna PA       - Shared decision making: Due to concerns about  family history of pulmonary embolisms patient and family have requested CTA chest.  We discussed risk and benefits of the scan and they understand and we will proceed with imaging.    Additional orders considered but not ordered:  I considered admitting the patient however his symptoms are reproducible with palpation of the chest wall, he has a heart score of 1, CTA chest negative, he is stable for further outpatient treatment and evaluation.          DIAGNOSIS  Final diagnoses:   Chest wall pain           Follow Up:  Lissa Silveira S, APRN  [address]  [facility] [address]  [phone]    Schedule an appointment as soon as possible for a visit       Zaire Lloyd MD  [address]  [facility] [address]  [phone]    Schedule an appointment as soon as possible for a visit       [facility] EMERGENCY DEPARTMENT  [address] [address] [phone]    If symptoms worsen or any concerns      RX:     Medication List        New Prescriptions      ibuprofen 800 MG tablet  Commonly known as: ADVIL,MOTRIN  Take 1 tablet by mouth Every 8 (Eight) Hours As Needed for Mild Pain (pain. take with food.).               Where to Get Your Medications        These medications were sent to [id number] - [place], [place] - [address] - [phone]  - [phone] FX  [address], [facility] [address]      Phone: [phone]   ibuprofen 800 MG tablet         Latest Documented Vital Signs:  As of 18:55 EDT  BP- 136/75 HR- 100 Temp- 97.7 °F (36.5 °C) O2 sat- 100%              --    Please note that portions of this were completed with a voice recognition program.       Note Disclaimer: At [facility], we believe that sharing information builds trust and better relationships. You are receiving this note because you are receiving care at [facility] or recently visited. It is possible  you will see health information before a provider has talked with you about it. This kind of information can be easy to misunderstand. To help you fully understand what it means for your health, we urge you to discuss this note with your provider.             Janice Anna PA  09/12/23 5584

## 2023-09-12 NOTE — ED PROVIDER NOTES
MD ATTESTATION NOTE    The KIESHA and I have discussed this patient's history, physical exam, and treatment plan.  I have reviewed the documentation and personally had a face to face interaction with the patient. I affirm the documentation and agree with the treatment and plan.  The attached note describes my personal findings.    I provided a substantive portion of the care of this patient. I personally performed the physical exam, in its entirety.    History  32-year-old male with history of chest pain presents with worsening discomfort in the left anterior chest.  Pain is worsened with movements or with palpation.  Denies recent injury.  Denies trouble breathing.    Physical Exam  Vital Signs reviewed  GENERAL: Alert male in no obvious distress.  Triage vitals reviewed and are benign.  O2 sats 100% on room air  HENT: nares patent  EYES: no scleral icterus  CV: regular rhythm, regular rate-no murmur  RESPIRATORY: normal effort, clear to auscultation bilaterally-O2 sats 100% on room air  ABDOMEN: soft, nontender  MUSCULOSKELETAL: Chest-there is some reproducible tenderness palpation along the left sternal border.  Upper extremities-unremarkable  Lower extremities-unremarkable  NEURO: Strength sensation and coordination are grossly intact.  Speech and mentation are unremarkable  SKIN: warm, dry      Disposition  I discussed treatment and evaluation of this patient with REJI gonsales.  EKG reviewed unremarkable.  Planning to get labs including cardiac markers and CT angiogram of the chest to rule out pulmonary embolism, large pericardial effusion or bony abnormalities that could be causing pain in the chest.  Pain is suggestive most likely of musculoskeletal but rule out the more serious causes with CT and labs.       Casey Miramontes MD  09/12/23 2196

## 2023-09-13 LAB
QT INTERVAL: 363 MS
QTC INTERVAL: 369 MS

## 2023-09-14 ENCOUNTER — TELEPHONE (OUTPATIENT)
Dept: CARDIOLOGY | Facility: CLINIC | Age: 33
End: 2023-09-14

## 2023-09-14 NOTE — TELEPHONE ENCOUNTER
Caller: Shiva Jones    Relationship: Self    Best call back number: [phone]    What is the best time to reach you: ANYTIME    Who are you requesting to speak with (clinical staff, provider,  specific staff member): CLINICAL        What was the call regarding: PT WENT TO ED 09/12/2023 WITH CHEST PAIN FROM SHOULDER TO FOREARM.  THEY PERFORMED  LABS, ECG, XR CHEST AND CT ANGIOGRAM.  DOES HE NEED TO COME BACK INTO OFFICE? OR DOES HE NEED TO DO SOMETHING ELSE? PLEASE ADVISE PT    Is it okay if the provider responds through MyChart: NO

## 2023-09-15 ENCOUNTER — OFFICE VISIT (OUTPATIENT)
Dept: CARDIOLOGY | Facility: CLINIC | Age: 33
End: 2023-09-15
Payer: COMMERCIAL

## 2023-09-15 VITALS
HEART RATE: 74 BPM | WEIGHT: 178 LBS | OXYGEN SATURATION: 100 % | SYSTOLIC BLOOD PRESSURE: 124 MMHG | BODY MASS INDEX: 21.02 KG/M2 | HEIGHT: 77 IN | DIASTOLIC BLOOD PRESSURE: 76 MMHG

## 2023-09-15 DIAGNOSIS — R07.89 OTHER CHEST PAIN: Primary | ICD-10-CM

## 2023-09-15 PROCEDURE — 99214 OFFICE O/P EST MOD 30 MIN: CPT | Performed by: NURSE PRACTITIONER

## 2023-09-15 RX ORDER — PANTOPRAZOLE SODIUM 40 MG/1
40 TABLET, DELAYED RELEASE ORAL DAILY
Qty: 90 TABLET | Refills: 0 | Status: SHIPPED | OUTPATIENT
Start: 2023-09-15

## 2023-09-15 RX ORDER — IBUPROFEN 800 MG/1
800 TABLET ORAL EVERY 8 HOURS
Qty: 90 TABLET | Refills: 0 | Status: SHIPPED | OUTPATIENT
Start: 2023-09-15

## 2023-09-15 NOTE — PROGRESS NOTES
CARDIOLOGY        Patient Name: Shiva Jones  :1990  Age: 32 y.o.  Primary Cardiologist: Zaire Lloyd MD  Encounter Provider:  ANSHUL Gamble    Date of Service: 09/15/23    CHIEF COMPLAINT / REASON FOR OFFICE VISIT     Chest Pain and ER follow up      HISTORY OF PRESENT ILLNESS       HPI  Shiva Jones is a 32 y.o. male who presents today for ED follow-up for chest pain.     Pt has a  history significant for chest pain.    Patient originally established care with Dr. Lloyd 8/10/2023 after an emergency room visit on 2023.  Patient reported that when walking out of the movies he had a severe episode of chest discomfort on the left side that worsens when lying on the left side.  Episode has recurred and was extremely intense.  Dr. Lloyd felt that pain was consistent with costochondritis and recommended continuing ibuprofen and to call in the next week or 2 if symptoms did not resolve.    Review of medical records reveals that patient was again evaluated in the emergency department on 2023 for chest pain on the left side that radiates to the left deltoid and down the forearm.  Pain has been present for 4 days and was intermittent.  No exacerbating or alleviating symptoms.  Patient reported to the emergency department that ibuprofen did not relieve his symptoms of pain.  High-sensitivity troponin negative.  Patient did have a CTA of the chest which was negative for PE.  Patient was advised to increase ibuprofen to 800 mg 3 times daily and follow-up in clinic.  He was given dose of IV Toradol in ED.    Patient reports that he has been experiencing chest pain that is described as midsternal that will intermittently radiate to the left arm and stop at the forearm.  The pain to the middle of the chest is a sharp pain and the arm pain is a burning sensation.  Denies rashes.  He denies associated symptoms of shortness of breath, nausea, diaphoresis.  Pain has occurred at rest.  He  "does admit that he has been able to play basketball and has not experienced any discomfort.  Denies association with eating or movements.  Pain does at times worsen with laying on the left side.  High dose ibuprofen has improved the symptoms.   Denies neck pain.    The following portions of the patient's history were reviewed and updated as appropriate: allergies, current medications, past family history, past medical history, past social history, past surgical history and problem list.      VITAL SIGNS     Visit Vitals  /76   Pulse 74   Ht 195.6 cm (77\")   Wt 80.7 kg (178 lb)   SpO2 100%   BMI 21.11 kg/m²         Wt Readings from Last 3 Encounters:   09/15/23 80.7 kg (178 lb)   09/12/23 82.6 kg (182 lb)   08/10/23 82.6 kg (182 lb)     Body mass index is 21.11 kg/m².      REVIEW OF SYSTEMS   Review of Systems   Constitutional: Negative for chills, fever, weight gain and weight loss.   Cardiovascular:  Positive for chest pain. Negative for leg swelling.   Respiratory:  Negative for cough, snoring and wheezing.    Hematologic/Lymphatic: Negative for bleeding problem. Does not bruise/bleed easily.   Skin:  Negative for color change.   Musculoskeletal:  Negative for falls, joint pain and myalgias.   Gastrointestinal:  Negative for melena.   Genitourinary:  Negative for hematuria.   Neurological:  Negative for excessive daytime sleepiness.   Psychiatric/Behavioral:  Negative for depression. The patient is not nervous/anxious.          PHYSICAL EXAMINATION     Constitutional:       Appearance: Normal appearance. Well-developed.   Eyes:      Conjunctiva/sclera: Conjunctivae normal.   Neck:      Vascular: No carotid bruit.   Pulmonary:      Effort: Pulmonary effort is normal.      Breath sounds: Normal breath sounds.   Cardiovascular:      Normal rate. Regular rhythm. Normal S1. Normal S2.       Murmurs: There is no murmur.      No gallop.  No click. No rub.   Edema:     Peripheral edema absent.   Musculoskeletal: " Normal range of motion. Skin:     General: Skin is warm and dry.   Neurological:      Mental Status: Alert and oriented to person, place, and time.      GCS: GCS eye subscore is 4. GCS verbal subscore is 5. GCS motor subscore is 6.   Psychiatric:         Speech: Speech normal.         Behavior: Behavior normal.         Thought Content: Thought content normal.         Judgment: Judgment normal.         REVIEWED DATA     Procedures          Lab Results   Component Value Date     09/12/2023     08/02/2023    K 4.2 09/12/2023    K 4.1 08/02/2023     09/12/2023     08/02/2023    CO2 28.2 09/12/2023    CO2 27.0 08/02/2023    BUN 10 09/12/2023    BUN 10 08/02/2023    CREATININE 1.04 09/12/2023    CREATININE 1.11 08/02/2023    EGFRIFAFRI 109 01/15/2021    GLUCOSE 88 09/12/2023    GLUCOSE 88 08/02/2023    CALCIUM 9.8 09/12/2023    CALCIUM 10.2 08/02/2023    ALBUMIN 4.6 09/12/2023    ALBUMIN 4.8 08/02/2023    BILITOT 0.8 09/12/2023    BILITOT 0.8 08/02/2023    AST 21 09/12/2023    AST 22 08/02/2023    ALT 15 09/12/2023    ALT 19 08/02/2023     Lab Results   Component Value Date    WBC 6.00 09/12/2023    WBC 4.33 08/02/2023    HGB 14.6 09/12/2023    HGB 15.1 08/02/2023    HCT 44.9 09/12/2023    HCT 46.0 08/02/2023    MCV 83.0 09/12/2023    MCV 83.9 08/02/2023     09/12/2023     08/02/2023     No results found for: PROBNP, BNP  Lab Results   Component Value Date    TROPONINT 8 09/12/2023     Lab Results   Component Value Date    TSH 1.110 01/15/2021             ASSESSMENT & PLAN     Diagnoses and all orders for this visit:    1. Other chest pain (Primary)  Patient reports intermittent episodes of midsternal chest discomfort that at times radiates to the left arm.  Patient has had ED evaluation x2 which reveals negative high-sensitivity troponins.  He has had a CTA of the chest to rule out pulmonary embolism.  No signs of pneumothorax on CTA of the chest or chest x-ray.  Patient and I had  "long discussion about differentials of chest pain to include but not limited to coronary artery disease, gastric reflux, anxiety.  Patient does admit that he has had increased episodes of \"gas pains\".  Patient is athletic and healthy, recommend evaluation of the structure and function of the heart with an echocardiogram to ensure that he does not have any LVH.  Recommend continuing ibuprofen 800 mg every 8 hours for 30 days.  He does note some improvement since taking higher dose of ibuprofen.  If patient does have costochondritis longer course of NSAID would be warranted  Add Protonix to help protect GI tract from ibuprofen as well as to see if it improves his heartburn/gas pains  Follow-up with me in 6-8 weeks.  -     Adult Transthoracic Echo Complete W/ Cont if Necessary Per Protocol; Future    Other orders  -     pantoprazole (Protonix) 40 MG EC tablet; Take 1 tablet by mouth Daily.  Dispense: 90 tablet; Refill: 0  -     ibuprofen (ADVIL,MOTRIN) 800 MG tablet; Take 1 tablet by mouth Every 8 (Eight) Hours.  Dispense: 90 tablet; Refill: 0      Time Spent: I spent 32 minutes caring for Shiva on this date of service. This time includes time spent by me in the following activities: preparing for the visit, reviewing tests, performing a medically appropriate examination and/or evaluation, counseling and educating the patient/family/caregiver, ordering medications, tests, or procedures, referring and communicating with other health care professionals, documenting information in the medical record, independently interpreting results and communicating that information with the patient/family/caregiver, and care coordination.         Return in about 6 weeks (around 10/27/2023) for ANSHUL Hewitt.    Future Appointments         Provider Department Center    11/27/2023 12:30 PM [facility] OUTPATIENT ECHOCARDIOGRAPHY [facility]    11/27/2023 1:20 PM Jesusita Le APRN [facility] " [facility] CARDIOLOGY [facility]                  MEDICATIONS         Discharge Medications            Accurate as of September 15, 2023 11:49 AM. If you have any questions, ask your nurse or doctor.                New Medications        Instructions Start Date   pantoprazole 40 MG EC tablet  Commonly known as: Protonix  Started by: Jesusita Le APRN   40 mg, Oral, Daily             Changes to Medications        Instructions Start Date   ibuprofen 800 MG tablet  Commonly known as: ADVIL,MOTRIN  What changed:   when to take this  reasons to take this  Changed by: Jesusita Le APRN   800 mg, Oral, Every 8 Hours                   **Dragon Disclaimer:   Much of this encounter note is an electronic transcription/translation of spoken language to printed text. The electronic translation of spoken language may permit erroneous, or at times, nonsensical words or phrases to be inadvertently transcribed. Although I have reviewed the note for such errors, some may still exist.

## 2023-11-27 ENCOUNTER — OFFICE VISIT (OUTPATIENT)
Dept: CARDIOLOGY | Facility: CLINIC | Age: 33
End: 2023-11-27
Payer: COMMERCIAL

## 2023-11-27 ENCOUNTER — HOSPITAL ENCOUNTER (OUTPATIENT)
Dept: CARDIOLOGY | Facility: HOSPITAL | Age: 33
Discharge: HOME | End: 2023-11-27
Admitting: NURSE PRACTITIONER
Payer: COMMERCIAL

## 2023-11-27 DIAGNOSIS — R07.89 OTHER CHEST PAIN: ICD-10-CM

## 2023-11-27 DIAGNOSIS — R07.89 CHEST PAIN, MUSCULOSKELETAL: Primary | ICD-10-CM

## 2023-11-27 LAB
AORTIC ARCH: 2.3 CM
AORTIC DIMENSIONLESS INDEX: 0.6 (DI)
ASCENDING AORTA: 2.6 CM
BH CV ECHO MEAS - ACS: 1.77 CM
BH CV ECHO MEAS - AO MAX PG: 8.1 MMHG
BH CV ECHO MEAS - AO MEAN PG: 4.6 MMHG
BH CV ECHO MEAS - AO ROOT DIAM: 2.45 CM
BH CV ECHO MEAS - AO V2 MAX: 142.5 CM/SEC
BH CV ECHO MEAS - AO V2 VTI: 31.7 CM
BH CV ECHO MEAS - AVA(I,D): 1.89 CM2
BH CV ECHO MEAS - EDV(CUBED): 94.3 ML
BH CV ECHO MEAS - EDV(MOD-SP2): 136 ML
BH CV ECHO MEAS - EDV(MOD-SP4): 111 ML
BH CV ECHO MEAS - EF(MOD-BP): 62.1 %
BH CV ECHO MEAS - EF(MOD-SP2): 63.2 %
BH CV ECHO MEAS - EF(MOD-SP4): 59.5 %
BH CV ECHO MEAS - ESV(CUBED): 30.4 ML
BH CV ECHO MEAS - ESV(MOD-SP2): 50 ML
BH CV ECHO MEAS - ESV(MOD-SP4): 45 ML
BH CV ECHO MEAS - FS: 31.4 %
BH CV ECHO MEAS - IVS/LVPW: 1.06 CM
BH CV ECHO MEAS - IVSD: 0.77 CM
BH CV ECHO MEAS - LAT PEAK E' VEL: 22.4 CM/SEC
BH CV ECHO MEAS - LV DIASTOLIC VOL/BSA (35-75): 52.1 CM2
BH CV ECHO MEAS - LV MASS(C)D: 106.6 GRAMS
BH CV ECHO MEAS - LV MAX PG: 3.4 MMHG
BH CV ECHO MEAS - LV MEAN PG: 1.87 MMHG
BH CV ECHO MEAS - LV SYSTOLIC VOL/BSA (12-30): 21.1 CM2
BH CV ECHO MEAS - LV V1 MAX: 92.7 CM/SEC
BH CV ECHO MEAS - LV V1 VTI: 18.6 CM
BH CV ECHO MEAS - LVIDD: 4.6 CM
BH CV ECHO MEAS - LVIDS: 3.1 CM
BH CV ECHO MEAS - LVOT AREA: 3.2 CM2
BH CV ECHO MEAS - LVOT DIAM: 2.02 CM
BH CV ECHO MEAS - LVPWD: 0.73 CM
BH CV ECHO MEAS - MED PEAK E' VEL: 12 CM/SEC
BH CV ECHO MEAS - MV A DUR: 0.15 SEC
BH CV ECHO MEAS - MV A MAX VEL: 29.8 CM/SEC
BH CV ECHO MEAS - MV DEC SLOPE: 457.5 CM/SEC2
BH CV ECHO MEAS - MV DEC TIME: 0.2 SEC
BH CV ECHO MEAS - MV E MAX VEL: 107 CM/SEC
BH CV ECHO MEAS - MV E/A: 3.6
BH CV ECHO MEAS - MV MAX PG: 5.5 MMHG
BH CV ECHO MEAS - MV MEAN PG: 1.36 MMHG
BH CV ECHO MEAS - MV P1/2T: 78.2 MSEC
BH CV ECHO MEAS - MV V2 VTI: 45.8 CM
BH CV ECHO MEAS - MVA(P1/2T): 2.8 CM2
BH CV ECHO MEAS - MVA(VTI): 1.31 CM2
BH CV ECHO MEAS - PA ACC TIME: 0.17 SEC
BH CV ECHO MEAS - PA V2 MAX: 109.9 CM/SEC
BH CV ECHO MEAS - PULM A REVS DUR: 0.14 SEC
BH CV ECHO MEAS - PULM A REVS VEL: 19 CM/SEC
BH CV ECHO MEAS - PULM DIAS VEL: 57.1 CM/SEC
BH CV ECHO MEAS - PULM S/D: 0.66
BH CV ECHO MEAS - PULM SYS VEL: 37.5 CM/SEC
BH CV ECHO MEAS - QP/QS: 0.72
BH CV ECHO MEAS - RAP SYSTOLE: 15 MMHG
BH CV ECHO MEAS - RV MAX PG: 3.3 MMHG
BH CV ECHO MEAS - RV V1 MAX: 90.7 CM/SEC
BH CV ECHO MEAS - RV V1 VTI: 19.1 CM
BH CV ECHO MEAS - RVOT DIAM: 1.7 CM
BH CV ECHO MEAS - RVSP: 35.3 MMHG
BH CV ECHO MEAS - SI(MOD-SP2): 40.3 ML/M2
BH CV ECHO MEAS - SI(MOD-SP4): 31 ML/M2
BH CV ECHO MEAS - SUP REN AO DIAM: 2.1 CM
BH CV ECHO MEAS - SV(LVOT): 59.8 ML
BH CV ECHO MEAS - SV(MOD-SP2): 86 ML
BH CV ECHO MEAS - SV(MOD-SP4): 66 ML
BH CV ECHO MEAS - SV(RVOT): 43.3 ML
BH CV ECHO MEAS - TAPSE (>1.6): 2.36 CM
BH CV ECHO MEAS - TR MAX PG: 20.3 MMHG
BH CV ECHO MEAS - TR MAX VEL: 225.3 CM/SEC
BH CV ECHO MEASUREMENTS AVERAGE E/E' RATIO: 6.22
BH CV XLRA - RV BASE: 3.8 CM
BH CV XLRA - RV LENGTH: 7.6 CM
BH CV XLRA - RV MID: 3.8 CM
BH CV XLRA - TDI S': 12.6 CM/SEC
LEFT ATRIUM VOLUME INDEX: 20.1 ML/M2
SINUS: 2.7 CM
STJ: 2.23 CM

## 2023-11-27 PROCEDURE — 99213 OFFICE O/P EST LOW 20 MIN: CPT | Performed by: NURSE PRACTITIONER

## 2023-11-27 PROCEDURE — 93306 TTE W/DOPPLER COMPLETE: CPT

## 2023-11-27 PROCEDURE — 93306 TTE W/DOPPLER COMPLETE: CPT | Performed by: INTERNAL MEDICINE

## 2023-11-27 NOTE — ASSESSMENT & PLAN NOTE
Patient was experiencing atypical chest discomfort and ultimately diagnosed with costochondritis  Patient has had multiple serial troponins which have been negative and multiple ECGs which were also negative for ischemia  CTA of the chest negative  Patient had echocardiogram and appointment today which revealed trace mitral valve regurgitation but otherwise normal.  Patient has now completed a 30-day course of NSAIDs and reports that he has not had any recurrence of chest discomfort.  Patient is exercising at a high level without any exertional symptoms  Pain thought to be musculoskeletal in nature.  Patient will monitor for recurrence and take over-the-counter ibuprofen as needed  Follow-up in clinic on an as-needed basis only.

## 2023-11-27 NOTE — PROGRESS NOTES
CARDIOLOGY        Patient Name: Shiva Jones  :1990  Age: 33 y.o.  Primary Cardiologist: Zaire Lloyd MD  Encounter Provider:  ANSHUL Gamble    Date of Service: 23    CHIEF COMPLAINT / REASON FOR OFFICE VISIT     Chest Pain      HISTORY OF PRESENT ILLNESS       HPI  Shiva Jones is a 33 y.o. male who presents today for 6-8-week follow-up from chest discomfort.    Pt has a  history significant for chest pain.    Patient originally established care with Dr. Lloyd 8/10/2023 after an emergency room visit on 2023.  Patient reported that when walking out of the movies he had a severe episode of chest discomfort on the left side that worsens when lying on the left side.  Episode has recurred and was extremely intense.  Dr. Lloyd felt that pain was consistent with costochondritis and recommended continuing ibuprofen and to call in the next week or 2 if symptoms did not resolve.    Review of medical records reveals that patient was again evaluated in the emergency department on 2023 for chest pain on the left side that radiates to the left deltoid and down the forearm.  Pain has been present for 4 days and was intermittent.  No exacerbating or alleviating symptoms.  Patient reported to the emergency department that ibuprofen did not relieve his symptoms of pain.  High-sensitivity troponin negative.  Patient did have a CTA of the chest which was negative for PE.  Patient was advised to increase ibuprofen to 800 mg 3 times daily and follow-up in clinic.  He was given dose of IV Toradol in ED.    Patient was evaluated by me approximately 6-8 weeks ago for ongoing atypical chest discomforts.  It was recommended for patient to have an echocardiogram prior to appointment today.  This was done and revealed preserved LVEF with trace mitral valve regurgitation.  Patient reports that he has now completed his 1 month therapy of NSAIDs.  He has only had very intermittent episodes of  "chest discomfort since that time.  Patient continues to exercise at a high level and notes that he played basketball last week without any exertional symptoms.  He is currently asymptomatic and denies chest pain, dyspnea at rest, dyspnea with exertion, palpitations, lightheadedness, edema, fatigue.    The following portions of the patient's history were reviewed and updated as appropriate: allergies, current medications, past family history, past medical history, past social history, past surgical history and problem list.      VITAL SIGNS     Visit Vitals  /70 (BP Location: Left arm, Patient Position: Sitting)   Pulse 51   Ht 77 cm (30.32\")   Wt 80.7 kg (178 lb)   SpO2 99%   .18 kg/m²           Wt Readings from Last 3 Encounters:   11/27/23 80.7 kg (178 lb)   09/15/23 80.7 kg (178 lb)   09/12/23 82.6 kg (182 lb)     Body mass index is 136.18 kg/m².      REVIEW OF SYSTEMS   Review of Systems   Constitutional: Negative for chills, fever, weight gain and weight loss.   Cardiovascular:  Negative for chest pain and leg swelling.   Respiratory:  Negative for cough, snoring and wheezing.    Hematologic/Lymphatic: Negative for bleeding problem. Does not bruise/bleed easily.   Skin:  Negative for color change.   Musculoskeletal:  Negative for falls, joint pain and myalgias.   Gastrointestinal:  Negative for melena.   Genitourinary:  Negative for hematuria.   Neurological:  Negative for excessive daytime sleepiness.   Psychiatric/Behavioral:  Negative for depression. The patient is not nervous/anxious.            PHYSICAL EXAMINATION     Constitutional:       Appearance: Normal appearance. Well-developed.   Eyes:      Conjunctiva/sclera: Conjunctivae normal.   Neck:      Vascular: No carotid bruit.   Pulmonary:      Effort: Pulmonary effort is normal.      Breath sounds: Normal breath sounds.   Cardiovascular:      Normal rate. Regular rhythm. Normal S1. Normal S2.       Murmurs: There is no murmur.      No " "gallop.  No click. No rub.   Edema:     Peripheral edema absent.   Musculoskeletal: Normal range of motion. Skin:     General: Skin is warm and dry.   Neurological:      Mental Status: Alert and oriented to person, place, and time.      GCS: GCS eye subscore is 4. GCS verbal subscore is 5. GCS motor subscore is 6.   Psychiatric:         Speech: Speech normal.         Behavior: Behavior normal.         Thought Content: Thought content normal.         Judgment: Judgment normal.           REVIEWED DATA     Procedures    Cardiac procedures:  Echocardiogram 11/27/2023.  LVEF 62%.  Diastolic function normal.  No pericardial effusion.  Trace to mild mitral valve regurgitation.      Lab Results   Component Value Date     09/12/2023     08/02/2023    K 4.2 09/12/2023    K 4.1 08/02/2023     09/12/2023     08/02/2023    CO2 28.2 09/12/2023    CO2 27.0 08/02/2023    BUN 10 09/12/2023    BUN 10 08/02/2023    CREATININE 1.04 09/12/2023    CREATININE 1.11 08/02/2023    EGFRIFAFRI 109 01/15/2021    GLUCOSE 88 09/12/2023    GLUCOSE 88 08/02/2023    CALCIUM 9.8 09/12/2023    CALCIUM 10.2 08/02/2023    ALBUMIN 4.6 09/12/2023    ALBUMIN 4.8 08/02/2023    BILITOT 0.8 09/12/2023    BILITOT 0.8 08/02/2023    AST 21 09/12/2023    AST 22 08/02/2023    ALT 15 09/12/2023    ALT 19 08/02/2023     Lab Results   Component Value Date    WBC 6.00 09/12/2023    WBC 4.33 08/02/2023    HGB 14.6 09/12/2023    HGB 15.1 08/02/2023    HCT 44.9 09/12/2023    HCT 46.0 08/02/2023    MCV 83.0 09/12/2023    MCV 83.9 08/02/2023     09/12/2023     08/02/2023     No results found for: \"PROBNP\", \"BNP\"  Lab Results   Component Value Date    TROPONINT 8 09/12/2023     Lab Results   Component Value Date    TSH 1.110 01/15/2021             ASSESSMENT & PLAN     There are no diagnoses linked to this encounter.    No follow-ups on file.          MEDICATIONS         Discharge Medications            Accurate as of November 27, 2023  " 1:51 PM. If you have any questions, ask your nurse or doctor.                Continue These Medications        Instructions Start Date   ibuprofen 800 MG tablet  Commonly known as: ADVIL,MOTRIN   800 mg, Oral, Every 8 Hours                   **Dragon Disclaimer:   Much of this encounter note is an electronic transcription/translation of spoken language to printed text. The electronic translation of spoken language may permit erroneous, or at times, nonsensical words or phrases to be inadvertently transcribed. Although I have reviewed the note for such errors, some may still exist.